# Patient Record
Sex: FEMALE | Race: WHITE | Employment: FULL TIME | URBAN - METROPOLITAN AREA
[De-identification: names, ages, dates, MRNs, and addresses within clinical notes are randomized per-mention and may not be internally consistent; named-entity substitution may affect disease eponyms.]

---

## 2021-07-23 ENCOUNTER — HOSPITAL ENCOUNTER (OUTPATIENT)
Age: 61
Setting detail: OUTPATIENT SURGERY
Discharge: HOME | End: 2021-07-23
Attending: INTERNAL MEDICINE | Admitting: INTERNAL MEDICINE
Payer: COMMERCIAL

## 2021-07-23 VITALS
TEMPERATURE: 97.4 F | RESPIRATION RATE: 18 BRPM | HEART RATE: 61 BPM | OXYGEN SATURATION: 97 % | WEIGHT: 149.8 LBS | HEIGHT: 65 IN | SYSTOLIC BLOOD PRESSURE: 105 MMHG | DIASTOLIC BLOOD PRESSURE: 62 MMHG | BODY MASS INDEX: 24.96 KG/M2

## 2021-07-23 PROCEDURE — G0500 MOD SEDAT ENDO SERVICE >5YRS: HCPCS | Performed by: INTERNAL MEDICINE

## 2021-07-23 PROCEDURE — 76040000007: Performed by: INTERNAL MEDICINE

## 2021-07-23 PROCEDURE — 77030040361 HC SLV COMPR DVT MDII -B: Performed by: INTERNAL MEDICINE

## 2021-07-23 PROCEDURE — 74011250636 HC RX REV CODE- 250/636: Performed by: INTERNAL MEDICINE

## 2021-07-23 PROCEDURE — 2709999900 HC NON-CHARGEABLE SUPPLY: Performed by: INTERNAL MEDICINE

## 2021-07-23 PROCEDURE — 77030039961 HC KT CUST COLON BSC -D: Performed by: INTERNAL MEDICINE

## 2021-07-23 RX ORDER — ROSUVASTATIN CALCIUM 5 MG/1
5 TABLET, COATED ORAL
COMMUNITY

## 2021-07-23 RX ORDER — SODIUM CHLORIDE 0.9 % (FLUSH) 0.9 %
5-40 SYRINGE (ML) INJECTION EVERY 8 HOURS
Status: CANCELLED | OUTPATIENT
Start: 2021-07-23

## 2021-07-23 RX ORDER — FENTANYL CITRATE 50 UG/ML
100 INJECTION, SOLUTION INTRAMUSCULAR; INTRAVENOUS
Status: DISCONTINUED | OUTPATIENT
Start: 2021-07-23 | End: 2021-07-23 | Stop reason: HOSPADM

## 2021-07-23 RX ORDER — DIPHENHYDRAMINE HYDROCHLORIDE 50 MG/ML
50 INJECTION, SOLUTION INTRAMUSCULAR; INTRAVENOUS ONCE
Status: DISCONTINUED | OUTPATIENT
Start: 2021-07-23 | End: 2021-07-23 | Stop reason: HOSPADM

## 2021-07-23 RX ORDER — ATROPINE SULFATE 0.1 MG/ML
0.5 INJECTION INTRAVENOUS
Status: CANCELLED | OUTPATIENT
Start: 2021-07-23 | End: 2021-07-24

## 2021-07-23 RX ORDER — MIDAZOLAM HYDROCHLORIDE 1 MG/ML
.25-5 INJECTION, SOLUTION INTRAMUSCULAR; INTRAVENOUS
Status: DISCONTINUED | OUTPATIENT
Start: 2021-07-23 | End: 2021-07-23 | Stop reason: HOSPADM

## 2021-07-23 RX ORDER — ATROPINE SULFATE 0.1 MG/ML
0.5 INJECTION INTRAVENOUS
Status: DISCONTINUED | OUTPATIENT
Start: 2021-07-23 | End: 2021-07-23 | Stop reason: HOSPADM

## 2021-07-23 RX ORDER — DIPHENHYDRAMINE HYDROCHLORIDE 50 MG/ML
50 INJECTION, SOLUTION INTRAMUSCULAR; INTRAVENOUS ONCE
Status: CANCELLED | OUTPATIENT
Start: 2021-07-23 | End: 2021-07-23

## 2021-07-23 RX ORDER — NALOXONE HYDROCHLORIDE 0.4 MG/ML
0.4 INJECTION, SOLUTION INTRAMUSCULAR; INTRAVENOUS; SUBCUTANEOUS
Status: DISCONTINUED | OUTPATIENT
Start: 2021-07-23 | End: 2021-07-23 | Stop reason: HOSPADM

## 2021-07-23 RX ORDER — SODIUM CHLORIDE 9 MG/ML
1000 INJECTION, SOLUTION INTRAVENOUS CONTINUOUS
Status: DISCONTINUED | OUTPATIENT
Start: 2021-07-23 | End: 2021-07-23 | Stop reason: HOSPADM

## 2021-07-23 RX ORDER — FLUMAZENIL 0.1 MG/ML
0.2 INJECTION INTRAVENOUS
Status: DISCONTINUED | OUTPATIENT
Start: 2021-07-23 | End: 2021-07-23 | Stop reason: HOSPADM

## 2021-07-23 RX ORDER — FENTANYL CITRATE 50 UG/ML
100 INJECTION, SOLUTION INTRAMUSCULAR; INTRAVENOUS
Status: CANCELLED | OUTPATIENT
Start: 2021-07-23 | End: 2021-07-23

## 2021-07-23 RX ORDER — DEXTROMETHORPHAN/PSEUDOEPHED 2.5-7.5/.8
1.2 DROPS ORAL
Status: CANCELLED | OUTPATIENT
Start: 2021-07-23

## 2021-07-23 RX ORDER — FLUMAZENIL 0.1 MG/ML
0.2 INJECTION INTRAVENOUS
Status: CANCELLED | OUTPATIENT
Start: 2021-07-23 | End: 2021-07-23

## 2021-07-23 RX ORDER — MIDAZOLAM HYDROCHLORIDE 1 MG/ML
.25-5 INJECTION, SOLUTION INTRAMUSCULAR; INTRAVENOUS
Status: CANCELLED | OUTPATIENT
Start: 2021-07-23 | End: 2021-07-23

## 2021-07-23 RX ORDER — SODIUM CHLORIDE 0.9 % (FLUSH) 0.9 %
5-40 SYRINGE (ML) INJECTION EVERY 8 HOURS
Status: DISCONTINUED | OUTPATIENT
Start: 2021-07-23 | End: 2021-07-23 | Stop reason: HOSPADM

## 2021-07-23 RX ORDER — SODIUM CHLORIDE 0.9 % (FLUSH) 0.9 %
5-40 SYRINGE (ML) INJECTION AS NEEDED
Status: DISCONTINUED | OUTPATIENT
Start: 2021-07-23 | End: 2021-07-23 | Stop reason: HOSPADM

## 2021-07-23 RX ORDER — EPINEPHRINE 0.1 MG/ML
1 INJECTION INTRACARDIAC; INTRAVENOUS
Status: CANCELLED | OUTPATIENT
Start: 2021-07-23 | End: 2021-07-24

## 2021-07-23 RX ORDER — SERTRALINE HYDROCHLORIDE 100 MG/1
100 TABLET, FILM COATED ORAL DAILY
COMMUNITY

## 2021-07-23 RX ORDER — EPINEPHRINE 0.1 MG/ML
1 INJECTION INTRACARDIAC; INTRAVENOUS
Status: DISCONTINUED | OUTPATIENT
Start: 2021-07-23 | End: 2021-07-23 | Stop reason: HOSPADM

## 2021-07-23 RX ORDER — NALOXONE HYDROCHLORIDE 0.4 MG/ML
0.4 INJECTION, SOLUTION INTRAMUSCULAR; INTRAVENOUS; SUBCUTANEOUS
Status: CANCELLED | OUTPATIENT
Start: 2021-07-23 | End: 2021-07-23

## 2021-07-23 RX ORDER — DEXTROMETHORPHAN/PSEUDOEPHED 2.5-7.5/.8
1.2 DROPS ORAL
Status: DISCONTINUED | OUTPATIENT
Start: 2021-07-23 | End: 2021-07-23 | Stop reason: HOSPADM

## 2021-07-23 RX ORDER — SODIUM CHLORIDE 9 MG/ML
1000 INJECTION, SOLUTION INTRAVENOUS CONTINUOUS
Status: CANCELLED | OUTPATIENT
Start: 2021-07-23 | End: 2021-07-23

## 2021-07-23 RX ORDER — SODIUM CHLORIDE 0.9 % (FLUSH) 0.9 %
5-40 SYRINGE (ML) INJECTION AS NEEDED
Status: CANCELLED | OUTPATIENT
Start: 2021-07-23

## 2021-07-23 NOTE — DISCHARGE INSTRUCTIONS
Jaja Amaro  [id number]  1960    COLON DISCHARGE INSTRUCTIONS    Discomfort:  Redness at IV site- apply warm compress to area; if redness or soreness persist- contact your physician  There may be a slight amount of blood passed from the rectum  Gaseous discomfort- walking, belching will help relieve any discomfort  You may not operate a vehicle til the next day. You may not engage in an occupation involving machinery or appliances til the next day. You may not drink alcoholic beverages til the next day. DIET:   High fiber diet. ACTIVITY:  You may not  resume your normal daily activities til the next day. it is recommended that you spend the remainder of the day resting -  avoid any strenuous activity. CALL M.D.  IF ANY SIGN OF:   Increasing pain, nausea, vomiting  Abdominal distension (swelling)  New increased bleeding (oral or rectal)  Fever (chills)  Pain in chest area  Bloody discharge from nose or mouth  Shortness of breath    You may  take any Advil, Aspirin, Ibuprofen, Motrin, Aleve, or Goodys but preferably Tylenol as needed for pain. Post procedure diagnosis:  Tortuous sigmoid, sigmoid diverticulosis; Follow-up Instructions: Your follow up colonoscopy will be in 10 years. Ernestine Romano MD  July 23, 2021       DISCHARGE SUMMARY from Nurse    PATIENT INSTRUCTIONS:    After general anesthesia or intravenous sedation, for 24 hours or while taking prescription Narcotics:  · Limit your activities  · Do not drive and operate hazardous machinery  · Do not make important personal or business decisions  · Do  not drink alcoholic beverages  · If you have not urinated within 8 hours after discharge, please contact your surgeon on call.     Report the following to your surgeon:  · Excessive pain, swelling, redness or odor of or around the surgical area  · Temperature over 100.5  · Nausea and vomiting lasting longer than 4 hours or if unable to take medications  · Any signs of decreased circulation or nerve impairment to extremity: change in color, persistent  numbness, tingling, coldness or increase pain  · Any questions    What to do at Home:  Recommended activity: as above,      *  Please give a list of your current medications to your Primary Care Provider. *  Please update this list whenever your medications are discontinued, doses are      changed, or new medications (including over-the-counter products) are added. *  Please carry medication information at all times in case of emergency situations. These are general instructions for a healthy lifestyle:    No smoking/ No tobacco products/ Avoid exposure to second hand smoke  Surgeon General's Warning:  Quitting smoking now greatly reduces serious risk to your health. Obesity, smoking, and sedentary lifestyle greatly increases your risk for illness    A healthy diet, regular physical exercise & weight monitoring are important for maintaining a healthy lifestyle    You may be retaining fluid if you have a history of heart failure or if you experience any of the following symptoms:  Weight gain of 3 pounds or more overnight or 5 pounds in a week, increased swelling in our hands or feet or shortness of breath while lying flat in bed. Please call your doctor as soon as you notice any of these symptoms; do not wait until your next office visit. The discharge information has been reviewed with the patient and friend. The patient and friend verbalized understanding. Discharge medications reviewed with the patient and friend and appropriate educational materials and side effects teaching were provided.   ___________________________________________________________________________________________________________________________________  Patient armband removed and shredded

## 2021-07-23 NOTE — H&P
kristieent/Plan  # Detail Type Description    1. Assessment Encounter for screening for cancer of colon (Z12.11). Impression Pt of Dr. Jayme Meier, here for her 10yr Recall, for screening colonoscopy. Last colonoscopy was done 2011 by Dr. Genesis Clark, mild sigmoid diverticulosis, negative exam, 7-10yr Recall. PM/SH: Appendectomy (2019), Hx of abnormal Pap smear s/p Colposcopy bx (), SCC of skin on face, Inner ear surgeries (), G2Ps ( x2: [id number] [facility]). Average risk, no FHx of colon cancer. Asymptomatic of GI complaints. BMI: 26.0, BM: 1-2/day. Patient Plan *C-scope Plan:  Second colonoscopy ordered with Dr. Karis Smith with Miralax bowel prep, and Mag Citrate 2 days before prep, and Miralax and stool softeners starting 3 days before prep.  -Patient is advised that they should take their aspirin (if prescribed) up until the day of procedure. *C-scope Risks:  Stressed importance of following all bowel preparation instructions. Explained the procedure to the patient including all risks and benefits. These risks consist of missed lesions on exam, bleeding, and bowel perforation with possible need for admission to the hospital, and in the most extensive of  circumstances, the patient may require surgery. Pt verbalized understanding of these risks and is agreeable with this procedure    Plan Orders Further diagnostic evaluations ordered today include(s) DIAGNOSTIC COLONOSCOPY to be performed. 2. Assessment Diverticular disease of colon (K57.30). Impression See Above. This 61year old  patient was referred by [facility]. This 61year old female presents for Colon Cancer Screening. History of Present Illness  1. Colon Cancer Screening   Prior screening:  colonoscopy. Denies risk factors.   Pertinent negatives include abdominal pain, change in bowel habits, change in stool caliber, constipation, decreased appetite, diarrhea, melena, nausea, rectal bleeding, vomiting, weight gain and weight loss. Additional information: No family history of colon cancer and BM 1-2x daily. Last colonoscopy was done 2011 by Dr. Michele Eric, mild sigmoid diverticulosis, negative exam, 7-10yr Recall. Problem List  Problem Description Onset Date Chronic Clinical Status Notes   Hearing loss 2017 Y     Hyperlipidemia, unspecified 2017 Y     Cervical high risk HPV DNA test positive 2017 Y     Abnormal finding on diagnostic imaging of head 2017 Y       Past Medical/Surgical History   (Detailed)  Disease/disorder Onset Date Management Date Comments   Diverticular disease       right stapedectomy       right tympanoplasty       Pregnancy-full term [id number]  - Full term [id number]    Pregnancy-full term [id number]  - Full term [id number]    History of Abnormal Pap  Colposcopy with biopsy 2017    Acute Appendicitis  Appendectomy (Dr. Nick Mai) 2019    Colonic Adenomas:  Negative Exams: ,         Gynecologic History  Patient is postmenopausal.   Postmenopausal age: 46.       Family History   (Detailed)    Relationship Family Member Name  Age at Death Condition Onset Age Cause of Death       No family history of (CRC) colorectal cancer. N   Father    Hypertension  N   Father    Diabetes mellitus  N   Father    High cholesterol  N   Mother    High cholesterol  N   Mother    Hypertension  N     Social History  (Detailed)  Tobacco use reviewed. Preferred language is [place]. Marital Status/Family/Social Support  Marital status:      Tobacco use status: Never smoked tobacco.    Smoking status: Never smoker. Tobacco Screening  Patient has never used tobacco. Patient has not used tobacco in the last 30 days. Patient has not used smokeless tobacco in the last 30 days.     Smoking Status  Type Smoking Status Usage Per Day Years Used Pack Years Total Pack Years    Never smoker         Tobacco/Vaping Exposure  No passive smoke exposure. Alcohol  There is a history of alcohol use. Type: Vodka. 2 drinks consumed daily. Caffeine  The patient uses caffeine: soda - 1 cup a day. Lifestyle  Pets   cats. Medications (active prior to today)  Medication Instructions Start Date Stop Date Refilled Elsewhere   Calcium 500 500 mg calcium (1,250 mg) tablet take 2 b y mouth once daily 11/07/2017 11/07/2017 N   vitamin T46 5,693 mcg-folic acid 135 mcg sublingual tablet take one by mouth once daily 11/07/2017 11/07/2017 N   Vitamin B-6 100 mg tablet take one by mouth once daily 11/07/2017 11/07/2017 N   Vitamin C 1,000 mg tablet take one by mouth twice daily 11/07/2017 11/07/2017 N   Vitamin D3 2,000 unit capsule take one by mouth twice daily 11/07/2017 11/07/2017 N   vitamin E 400 unit capsule take one by mouth once daily 11/07/2017 11/07/2017 N   rosuvastatin 5 mg tablet take 1 tablet by ORAL route  every day 07/22/2020   N   aspirin 81 mg tablet,delayed release take 1 tablet by oral route  every day 07/23/2020   N   sertraline 100 mg tablet take 1 tablet by oral route  every day 02/03/2021 02/03/2021 N     Patient Status   Completed with information received for patient in a summary of care record. Medication Reconciliation  Medications reconciled today.     Medication Reviewed  Adherence Medication Name Sig Desc Elsewhere Status   taking as directed sertraline 100 mg tablet take 1 tablet by oral route  every day N Verified   taking as directed aspirin 81 mg tablet,delayed release take 1 tablet by oral route  every day N Verified   taking as directed Vitamin B-6 100 mg tablet take one by mouth once daily N Verified   taking as directed Vitamin D3 2,000 unit capsule take one by mouth twice daily N Verified   taking as directed rosuvastatin 5 mg tablet take 1 tablet by ORAL route  every day N Verified   taking as directed Vitamin C 1,000 mg tablet take one by mouth twice daily N Verified   taking as directed vitamin F72 4,581 mcg-folic acid 846 mcg sublingual tablet take one by mouth once daily N Verified   taking as directed vitamin E 400 unit capsule take one by mouth once daily N Verified   taking as directed Calcium 500 500 mg calcium (1,250 mg) tablet take 2 b y mouth once daily N Verified     Medications (Added, Continued or Stopped today)  Start Date Medication Directions PRN Status PRN Reason Instruction Stop Date   07/23/2020 aspirin 81 mg tablet,delayed release take 1 tablet by oral route  every day N      11/07/2017 Calcium 500 500 mg calcium (1,250 mg) tablet take 2 b y mouth once daily N      07/22/2020 rosuvastatin 5 mg tablet take 1 tablet by ORAL route  every day N      02/03/2021 sertraline 100 mg tablet take 1 tablet by oral route  every day N      11/07/2017 Vitamin B-6 100 mg tablet take one by mouth once daily N      11/07/2017 vitamin W07 7,841 mcg-folic acid 135 mcg sublingual tablet take one by mouth once daily N      11/07/2017 Vitamin C 1,000 mg tablet take one by mouth twice daily N      11/07/2017 Vitamin D3 2,000 unit capsule take one by mouth twice daily N      11/07/2017 vitamin E 400 unit capsule take one by mouth once daily N        Allergies  Ingredient Reaction (Severity) Medication Name Comment   NO KNOWN ALLERGIES            Orders  Status Lab Order Time Frame Comments   result received CBC, Platelet; No Differential     result received Comp. Metabolic Panel (14)     result received Lipid Panel With LDL/HDL Ratio     result received TSH     result received Urinalysis, Routine     result received Vitamin B12     obtained * Screening mammography digital     result received NuSwab Vaginitis Plus (VG+)     result received HSV RICKIE     result received Pap Lb, HPV-hr     scheduled Referrals: Sleep Health.  Rory Aponte MD. Evaluate and treat     result received NuSwab Vaginitis Plus (VG+)     result received NuSwab Vaginitis Plus (VG+) result received NuSwab Vaginitis Plus (VG+)     result received NuSwab Vaginitis Plus (VG+)     ordered NuSwab Vaginitis Plus (VG+)     result received Urine Culture, Routine     result received CBC, Platelet; No Differential     result received Comp. Metabolic Panel (14)     result received Lipid Panel With LDL/HDL Ratio     result received TSH     ordered Screening mammography digital     result received Pap Lb, HPV-hr     scheduled Referrals: Neurology. Alexandre Butler MD. Evaluate and treat     scheduled Referrals: Neurology. Bethena Boxer MD. Evaluate and treat  PLease arrive at [phone]   scheduled Referrals: Obstetrics and Gynecology. Yolanda Camarillo DO. Evaluate and treat     ordered Referrals: Neurology. Bethena Boxer MD. Location:  . Evaluate and treat     result received Histopathology  CxBx/ECC   result received Urine Culture, Routine     result received Screening mammography digital     result received CBC With Differential/Platelet     result received Comp. Metabolic Panel (14)     result received Lipid Panel With LDL/HDL Ratio     result received Urinalysis, Routine     result received TSH     ordered Screening mammography digital     ordered HCV Antibody     ordered HBsAg Screen     ordered HIV 1/0/2 Ag/Ab with Reflex     ordered RPR     result received Pap Lb, HPV-hr     result received NuSwab VG+, HSV     ordered X-RAY LOWER SPINE, 4+ Views     scheduled Referrals: Sleep Health. Geraldyne Dandy MD. Evaluate and treat  Pulm will contact pt   ordered Referrals: Obstetrics and Gynecology. Yolanda Camarillo DO.  Evaluate and treat     result received Screening mammography digital     result received X-RAY LOWER SPINE, 4+ Views     result received Home Sleep Study     ordered follow-up visit with Shaista Nettles MD in 6 Months in 6 Months    result received Pap IG, rfx HPV ASCU     result received Pap IG, rfx HPV ASCU     result received HSV Culture and Typing  left buttocks   result received CBC With Differential/Platelet     result received Comp. Metabolic Panel (14)     result received Lipid Panel With LDL/HDL Ratio     result received TSH     result received Urinalysis, Routine     ordered Referrals: Dermatology. Mikaela Pinzon MD. Location: . Evaluate and treat     ordered Referrals: Ophthalmology. Meredith Palma MD. Location: . Evaluate and treat     ordered Screening Mammography Digital     result received Pap IG, rfx HPV ASCU     result received Screening Mammography Digital     result received CBC With Differential/Platelet     result received Comp. Metabolic Panel (14)     result received TSH     result received Urinalysis, Routine     result received Lipid Panel With LDL/HDL Ratio     result received Pap Lb, HPV-hr     result received NuSwab Vaginitis Plus (VG+)     ordered MRI Brain W/Contrast     ordered Screening Mammography Digital     result received Urine Culture, Routine     result received Urinalysis, Complete     ordered Referrals: Otolaryngology. Tamika Jimenez MD. Evaluate and treat     result received Pap Lb (Liquid-based)     result received TRANSVAGINAL US, NON-OB     result received CT Head/Brain w/o Dye     result received Screening Mammography Digital     ordered MRI Brain W/Contrast     result received MRI BRAIN W/O & W/DYE     ordered Referrals: Ophthalmology. . Location: [facility]. Evaluate and treat     result received X-RAY OF ANKLE, Complete     ordered CBC w DIFF     ordered COMPREHENSIVE METABOLIC PANEL     ordered LIPID PROFILE COMPLETE     ordered THYROID STIMULATING HORMONE (TSH)     ordered URINALYSIS ROUTINE (UA)     ordered URINALYSIS ROUTINE     ordered CBC w DIFF     ordered THYROID STIMULATING HORMONE     ordered LIPID PROFILE COMPLETE     ordered COMPREHENSIVE METABOLIC PANEL     ordered Screening Mammography Digital     ordered Dexa, Bone Density Scan     ordered CT CHEST W/O DYE     ordered Referrals: Podiatry.  Marty Beckett DPM. Location: . Evaluate and treat ordered URINE CULTURE     ordered GYN PAP, LIQUID BASED, W/HPV     ordered COMPREHENSIVE METABOLIC PANEL     ordered LIPID PROFILE COMPLETE     ordered HPV HIGH RISK     ordered GYNECOLOGIC CYTOLOGY REPORT     ordered URINE CULTURE     ordered Referrals: Urology. Lizzie Mahoney MD. Evaluate and treat     result received CT Abdomen And Pelvis W/and W/O     result received CT CHEST W/DYE     result received Screening Mammography Digital     result received MRI JNT OF LWR EXTRE W/O DYE     ordered Screening Mammography Digital     result received Dexa, Bone Density Scan     ordered COMPREHENSIVE METABOLIC PANEL     ordered LIPID PROFILE COMPLETE     ordered Referrals: Gastroenterology. Evaluate and treat  please schedule pt to be seen in the spring   ordered Pathology (Pathology (Biopsy))  benign keratosis v bcc v scc   ordered DIAGNOSTIC COLONOSCOPY     ordered Pathology (tissue specimen)         Review of Systems  System Neg/Pos Details   Constitutional Negative Fever, Weight gain and Weight loss. ENMT Negative Sinus Infection. Eyes Negative Double vision. Respiratory Negative Asthma, Chronic cough and Dyspnea. Cardio Negative Chest pain, Edema and Irregular heartbeat/palpitations. GI Negative Abdominal pain, Change in bowel habits, Change in stool caliber, Constipation, Decreased appetite, Diarrhea, Dysphagia, Heartburn, Hematemesis, Hematochezia, Melena, Nausea, Rectal bleeding, Reflux and Vomiting.  Negative Dysuria and Hematuria. Endocrine Negative Cold intolerance and Heat intolerance. Neuro Negative Dizziness, Headache, Numbness and Tremors. Psych Negative Anxiety, Depression and Increased stress. Integumentary Negative Hives, Pruritus and Rash. MS Negative Back pain, Joint pain and Myalgia. Hema/Lymph Negative Easy bleeding, Easy bruising and Lymphadenopathy. Allergic/Immuno Negative Food allergies and Immunosuppression.    Reproductive Positive The patient is post-menopausal (Occurred at age 46). Vital Signs   Gynecologic History  Patient is postmenopausal.      Height  Time ft in cm Last Measured Height Position   3:47 PM 5.0 4.00 162.56 04/15/2021 Standing     Weight/BSA/BMI  Time lb oz kg Context BMI kg/m2 BSA m2   3:47 .00  68.946 dressed with shoes 26.09 1.76     Date/Time Temp Pulse BP Cardiac Rhythm [facility]   07/23/21 0915 97.2 °F (36.2 °C) 72 126/85 -- SW      Respiratory Therapy (last day)    Date/Time Resp SpO2 O2 Device O2 Flow Rate (L/min) [facility]   07/23/21 0915 16 99 % None (Room air) -- SW       Physical  Exam  Exam Findings Details   Constitutional Normal Well developed. Eyes Normal Conjunctiva - Right: Normal, Left: Normal. Sclera - Right: Normal, Left: Normal.   Nasopharynx Normal Lips/teeth/gums - Normal.   Neck Exam Normal Inspection - Normal.   Respiratory Normal Inspection - Normal.   Cardiovascular Normal Regular rate and rhythm. No murmurs, gallops, or rubs. Vascular Normal Pulses - Brachial: Normal.   Skin Normal Inspection - Normal.   Musculoskeletal Normal Hands/Wrist - Right: Normal, Left: Normal.   Extremity Normal No edema. Neurological Normal Fine motor skills - Normal.   Psychiatric Normal Orientation - Oriented to time, place, person & situation. Appropriate mood and affect.    Immunizations Entered by History  Date Immunization   9/12/2018 12:00:00 AM Shingrix   7/11/2018 12:00:00 AM Shingrix   8/26/2018 12:00:00 AM Hep A and Hep B, adult   3/2/2018 12:00:00 AM Hep A and Hep B, adult   3/2/2018 12:00:00 AM Zostavax   2/1/2018 12:00:00 AM Hep A and Hep B, adult   2/1/2018 12:00:00 AM Pneumo (2 yrs or older) (PPV23)       Active Patient Care Team Members  Name Contact Agency Type Support Role Relationship Active Date Inactive Date Specialty   Eleuterio Renee   Patient provider PCP   [id number]par   encounter provider    Gastroenterology       No change in H&P

## 2021-07-23 NOTE — PROCEDURES
[facility]  Colonoscopy Procedure Report  _______________________________________________________  Patient: Kathy Solano                                        Attending Physician: Caitlin Alicea MD    Patient ID: [id number]                                    Referring Physician: Waverly Mcardle, DO    Exam Date: 2021      Introduction: A  64 y.o. female patient, presents for inpatient Colonoscopy    Indications: Pt of Dr. Judith Mims, here for her 10yr Recall, for second screening colonoscopy. Average risk,   [facility],   PM/SH: Appendectomy (2019), Hx of abnormal Pap smear s/p Colposcopy bx (), SCC of skin on face, Inner ear surgeries (), G2Ps ([facility] x2: [id number] [facility]). No FHx of colon cancer. Asymptomatic. BMI: 26.0, BM: 1-2/day. Consent: The benefits, risks, and alternatives to the procedure were discussed and informed consent was obtained from the patient. Preparation: EKG, pulse, pulse oximetry and blood pressure were monitored throughout the procedure. ASA Classification: Class I- . The heart is an S1-S2 and regular heart rate and rhythm. Lungs are clear to auscultation and percussion. Abdomen is soft, nondistended, and nontender. Mental Status: awake, alert, and oriented to person, place, and time    Medications:  · Fentanyl 100 mcg IV before procedure. · Versed 5 mg IV throughout the procedure. Rectal Exam: Normal Rectal Exam. No Blood. Pathology Specimens:  0    Procedure: The colonoscope was passed with mild difficulty through the anus under direct visualization and advanced to the cecum and 5 cm inside the terminal ileum. Retroflexion is made in the ascending colon. The scope was withdrawn and the mucosa was carefully examined. The quality of the preparation was excellent. The views were excellent. The patient's toleration of the procedure was excellent.  The exam was done twice to the cecum. Total time is 18 minutes and withdrawal time is 11 minutes. Findings:    Rectum:   Small internal hemorrhoids. Sigmoid:   Tortuous sigmoid colon with mild sigmoid diverticulosis. Descending Colon:   Normal   Transverse Colon:   Normal   Ascending Colon:   One small diverticulum ion the proximal ascending colon. Cecum:   Normal  Terminal Ileum:   Normal.       Unplanned Events: There were no unplanned events. Estimated Blood Loss: None  IMPLANTS: * No implants in log *  Impressions: Small internal hemorrhoids. Tortuous sigmoid colon with mild sigmoid diverticulosis. One small diverticulum ion the proximal ascending colon. Normal Mucosa. No blood, polyps or AVM found. Complications: None; patient tolerated the procedure well. Recommendations:  · Discharge home when standard parameters are met. · Resume a high fiber diet. · Resume own medications. Colonoscopy recommendation in 10 years.   · Take Miralax and/ or Colace 100 mg on regular basis if constipated    Procedure Codes:     COLONOSCOPY [[id number] (Type: Custom)]    Endoscope Information:  Model Number(s)    [id number]   Assistant: None  Signed By: Christie Alicea MD Date: 7/23/2021

## (undated) DEVICE — WRISTBAND ID AD W2.5XL9.5CM RED VYN ADH CLSR UNI-PRINT

## (undated) DEVICE — SYR 5ML 1/5 GRAD LL NSAF LF --

## (undated) DEVICE — SET ADMIN 16ML TBNG L100IN 2 Y INJ SITE IV PIGGY BK DISP

## (undated) DEVICE — NDL PRT INJ NSAF BLNT 18GX1.5 --

## (undated) DEVICE — CATH SUC CTRL PRT TRIFLO 14FR --

## (undated) DEVICE — KENDALL RADIOLUCENT FOAM MONITORING ELECTRODE RECTANGULAR SHAPE: Brand: KENDALL

## (undated) DEVICE — CANNULA CUSH AD W/ 14FT TBG

## (undated) DEVICE — TUBING, SUCTION, 1/4" X 12', STRAIGHT: Brand: MEDLINE

## (undated) DEVICE — TRNQT TEXT 1X18IN BLU LF DISP -- CONVERT TO ITEM 362165

## (undated) DEVICE — NDL FLTR TIP 5 MIC 18GX1.5IN --

## (undated) DEVICE — SPONGE GZ W4XL4IN RAYON POLY 4 PLY NONWOVEN FASTER WICKING

## (undated) DEVICE — GARMENT,MEDLINE,DVT,INT,CALF,MED, GEN2: Brand: MEDLINE

## (undated) DEVICE — SYR 3ML LL TIP 1/10ML GRAD --

## (undated) DEVICE — CATH IV SAFE STR 22GX1IN BLU -- PROTECTIV PLUS

## (undated) DEVICE — SINGLE PORT MANIFOLD: Brand: NEPTUNE 2

## (undated) DEVICE — Device

## (undated) DEVICE — SYRINGE 50ML E/T

## (undated) DEVICE — MAJ-1414 SINGLE USE ADPATER BIOPSY VALV: Brand: SINGLE USE ADAPTOR BIOPSY VALVE

## (undated) DEVICE — SPONGE GZ W4XL4IN COT 12 PLY TYP VII WVN C FLD DSGN

## (undated) DEVICE — TRAP SPEC COLL POLYP POLYSTYR --

## (undated) DEVICE — SOLUTION IV 500ML 0.9% SOD CHL FLX CONT